# Patient Record
(demographics unavailable — no encounter records)

---

## 2024-11-06 NOTE — ASSESSMENT
[FreeTextEntry1] :  64-year-old man s/p TUNA, urolift in 11/2022 who presents for PAE Evaluation. He is here today for MRI review. About 2 weeks ago, went to ER for AUR after being on a 4-hour flight, straight cath'd in ER and discharged without wood   MRI reviewed, due to large median lobe, recommended HOLEP with Dr. Alvarez. Case discussed with Dr. Mazariegos.  Discuss r/b/a of HOLEP such as bleeding and urinary incontinence  Thank you very much for allowing me to assist in the care of this patient. Please do not hesitate to contact me with any additional questions or concerns.  Sincerely,  Anthony Mi D.O. Professor of Urology and Radiology  of Urology at [facility] Director for Prostate Cancer [address] [place] [address], [address] [place] [address] Phone: [phone].

## 2024-11-06 NOTE — HISTORY OF PRESENT ILLNESS
[FreeTextEntry1] : Dear Dr. Mazariegos,  Thank you so much for the referral to help care for your patient.  Chief Complaint: PAE evaluation Date of first visit: 6/12/24  Sofiya Curry is a 64-year-old man s/p TUNA, urolift in 11/2022 who presents for PAE Evaluation. Reports urinary frequency, intermittency, and urgency, no dysuria, no gross hematuria. Patient unsure if he's had prostate MRI in the past, he had biopsy in 2021. No prostate cancer, HGPIN in 2 cores. No family hx of prostate, kidney and bladder cancers. Taking finasteride and tamsulosin.  Prostate biopsy by Dr. Mazariegos 11/16/21 Negative for prostate cancer HGPIN in [facility]  MRI hx:  MRI in [facility] on 6/26/24 79cc, PIRADS 1, On review, large median lobe, No EPE. No LAD and no bony lesions. The images have been reviewed and clinical implications discussed with the patient.  PSA Hx: 3.09  06/12/2024, 6.18 (corrected for finasteride) psad 0.07 ng/ml/cc  12/4/23 6.77 ng/mL ,13.54 (corrected for finsteride)  PMHx: BPH with LUTS  SxHx: urolift  FHx: none  SocHx: Nonsmoker No alcohol use  Allergies: NKDA  11/06/2024 IPSS   35   QOL 6 Flow nondiagnostic  PVR 110cc   6/12/24 IPSS 28 QOL 4 NATHALIE refused Flow max flow 6.6 ml/s, ave 3.5 ml/s, VV 32.9 cc PVR 22 cc  The patient denies fevers, chills, nausea and or vomiting and no unexplained weight loss.  All pertinent laboratory, films and physician notes were reviewed. Questionnaire results were discussed with patient.  I discussed with the patient and reviewed the risks and benefits of prostate artery embolization.  We discussed the early result and success of the procedure in general as well as my personal results. Specifically, reviewed risk related to nontarget embolization most commonly involving the bladder and/or rectum. We also spoke about the potential for post procedure obstruction required wood catheter drainage. We reviewed some technical aspects up of the procedure including embolic material utilizing the importance of cone beam CT prior to embolization.  Specific risks of the embolization procedure which were discussed with the patient including infection, bleeding, dysuria, hematuria, hematospermia, non-target embolization which would result in damage to bladder wall leading to ulceration/ischemia, rectal wall injury, and injury to penis and ejaculatory mechanism. These risks are considered to be low. Pelvic pain and burning is not uncommon and considered to be mild and transient. The patient understands that a Wood catheter may be inserted during the procedure to help guide embolization and will be removed at the end of the procedure. A small percentage of patients will have some degree of urinary retention after embolization and will require a catheter to be left in. He understands this. We also discussed that long-term results of prostate embolization are unknown but response rates in reduction of his IPSS score are 80-85% based on current literature and state of the art techniques.  It was explained to the patient that approximate 5% patient experienced some degree bruising following femoral or radial artery catheterization. The hematoma is benign and resolves spontaneously under typical circumstances. The specific benefits and risks of transradial (TR) access versus transfemoral (TF) access were discussed in detail with the patient. This includes decreased risk of bleeding, immediate ambulation and faster discharge time. Potential increased and extremely remote risk of cerebral emboli was discussed. The patient was given information packet with further details. Discussed off label use of FDA liquid embolic.  Due to large median lobe seen on MRI, patient agreed to proceed with [facility] Dr. Alvarez.

## 2024-11-06 NOTE — ASSESSMENT
[FreeTextEntry1] :  64-year-old man s/p TUNA, urolift in 11/2022 who presents for PAE Evaluation. He is here today for MRI review. About 2 weeks ago, went to ER for AUR after being on a 4-hour flight, straight cath'd in ER and discharged without wood   MRI reviewed, due to large median lobe, recommended HOLEP with Dr. Alvarez. Case discussed with Dr. Mazariegos.  Discuss r/b/a of HOLEP such as bleeding and urinary incontinence  Thank you very much for allowing me to assist in the care of this patient. Please do not hesitate to contact me with any additional questions or concerns.  Sincerely,  Anthony Mi D.O. Professor of Urology and Radiology  of Urology at [facility] Director for Prostate Cancer [address] [place] [address], [address] [place] [address] Phone: [phone].

## 2024-11-06 NOTE — HISTORY OF PRESENT ILLNESS
[FreeTextEntry1] : Dear Dr. Mazariegos,  Thank you so much for the referral to help care for your patient.  Chief Complaint: PAE evaluation Date of first visit: 6/12/24  Sofiya Curry is a 64-year-old man s/p TUNA, urolift in 11/2022 who presents for PAE Evaluation. Reports urinary frequency, intermittency, and urgency, no dysuria, no gross hematuria. Patient unsure if he's had prostate MRI in the past, he had biopsy in 2021. No prostate cancer, HGPIN in 2 cores. No family hx of prostate, kidney and bladder cancers. Taking finasteride and tamsulosin.  Prostate biopsy by Dr. Mazariegos 11/16/21 Negative for prostate cancer HGPIN in [facility]  MRI hx:  MRI in [facility] on 6/26/24 79cc, PIRADS 1, On review, large median lobe, No EPE. No LAD and no bony lesions. The images have been reviewed and clinical implications discussed with the patient.  PSA Hx: 3.09  06/12/2024, 6.18 (corrected for finasteride) psad 0.07 ng/ml/cc  12/4/23 6.77 ng/mL ,13.54 (corrected for finsteride)  PMHx: BPH with LUTS  SxHx: urolift  FHx: none  SocHx: Nonsmoker No alcohol use  Allergies: NKDA  11/06/2024 IPSS   35   QOL 6 Flow nondiagnostic  PVR 110cc   6/12/24 IPSS 28 QOL 4 NATHALIE refused Flow max flow 6.6 ml/s, ave 3.5 ml/s, VV 32.9 cc PVR 22 cc  The patient denies fevers, chills, nausea and or vomiting and no unexplained weight loss.  All pertinent laboratory, films and physician notes were reviewed. Questionnaire results were discussed with patient.  I discussed with the patient and reviewed the risks and benefits of prostate artery embolization.  We discussed the early result and success of the procedure in general as well as my personal results. Specifically, reviewed risk related to nontarget embolization most commonly involving the bladder and/or rectum. We also spoke about the potential for post procedure obstruction required wood catheter drainage. We reviewed some technical aspects up of the procedure including embolic material utilizing the importance of cone beam CT prior to embolization.  Specific risks of the embolization procedure which were discussed with the patient including infection, bleeding, dysuria, hematuria, hematospermia, non-target embolization which would result in damage to bladder wall leading to ulceration/ischemia, rectal wall injury, and injury to penis and ejaculatory mechanism. These risks are considered to be low. Pelvic pain and burning is not uncommon and considered to be mild and transient. The patient understands that a Wood catheter may be inserted during the procedure to help guide embolization and will be removed at the end of the procedure. A small percentage of patients will have some degree of urinary retention after embolization and will require a catheter to be left in. He understands this. We also discussed that long-term results of prostate embolization are unknown but response rates in reduction of his IPSS score are 80-85% based on current literature and state of the art techniques.  It was explained to the patient that approximate 5% patient experienced some degree bruising following femoral or radial artery catheterization. The hematoma is benign and resolves spontaneously under typical circumstances. The specific benefits and risks of transradial (TR) access versus transfemoral (TF) access were discussed in detail with the patient. This includes decreased risk of bleeding, immediate ambulation and faster discharge time. Potential increased and extremely remote risk of cerebral emboli was discussed. The patient was given information packet with further details. Discussed off label use of FDA liquid embolic.  Due to large median lobe seen on MRI, patient agreed to proceed with [facility] Dr. Alvarez.

## 2024-11-06 NOTE — ASSESSMENT
[FreeTextEntry1] :  64-year-old man s/p TUNA, urolift in 11/2022 who presents for PAE Evaluation. He is here today for MRI review. About 2 weeks ago, went to ER for AUR after being on a 4-hour flight, straight cath'd in ER and discharged without wood   MRI reviewed, due to large median lobe, recommended HOLEP with Dr. Alvarez. Case discussed with Dr. Mazariegos.  Discuss r/b/a of HOLEP such as bleeding and urinary incontinence  Thank you very much for allowing me to assist in the care of this patient. Please do not hesitate to contact me with any additional questions or concerns.  Sincerely,  Anthony Mi D.O. Professor of Urology and Radiology  of Urology at [facility] Director for Prostate Cancer [address] [place] [address], [address] [place] [address] Phone: [phone].

## 2024-11-06 NOTE — ADDENDUM
[FreeTextEntry1] :   I, Dr. Mi, personally performed the evaluation and management (E/M) services for this established patient who presents today with (a) new problem(s)/exacerbation of (an) existing condition(s).  That E/M includes conducting the examination, assessing all new/exacerbated conditions, and establishing a new plan of care.  Today, my ACP, Tomeka Keys, ANP-BC, was here to observe my evaluation and management services for this new problem/exacerbated condition to be followed going forward.

## 2024-11-06 NOTE — HISTORY OF PRESENT ILLNESS
[FreeTextEntry1] : Dear Dr. Mazariegos,  Thank you so much for the referral to help care for your patient.  Chief Complaint: PAE evaluation Date of first visit: 6/12/24  Sofiya Curry is a 64-year-old man s/p TUNA, urolift in 11/2022 who presents for PAE Evaluation. Reports urinary frequency, intermittency, and urgency, no dysuria, no gross hematuria. Patient unsure if he's had prostate MRI in the past, he had biopsy in 2021. No prostate cancer, HGPIN in 2 cores. No family hx of prostate, kidney and bladder cancers. Taking finasteride and tamsulosin.  Prostate biopsy by Dr. Mazariegos 11/16/21 Negative for prostate cancer HGPIN in [facility]  MRI hx:  MRI in [facility] on 6/26/24 79cc, PIRADS 1, On review, large median lobe, No EPE. No LAD and no bony lesions. The images have been reviewed and clinical implications discussed with the patient.  PSA Hx: 3.09  06/12/2024, 6.18 (corrected for finasteride) psad 0.07 ng/ml/cc  12/4/23 6.77 ng/mL ,13.54 (corrected for finsteride)  PMHx: BPH with LUTS  SxHx: urolift  FHx: none  SocHx: Nonsmoker No alcohol use  Allergies: NKDA  11/06/2024 IPSS   35   QOL 6 Flow nondiagnostic  PVR 110cc   6/12/24 IPSS 28 QOL 4 NATHALIE refused Flow max flow 6.6 ml/s, ave 3.5 ml/s, VV 32.9 cc PVR 22 cc  The patient denies fevers, chills, nausea and or vomiting and no unexplained weight loss.  All pertinent laboratory, films and physician notes were reviewed. Questionnaire results were discussed with patient.  I discussed with the patient and reviewed the risks and benefits of prostate artery embolization.  We discussed the early result and success of the procedure in general as well as my personal results. Specifically, reviewed risk related to nontarget embolization most commonly involving the bladder and/or rectum. We also spoke about the potential for post procedure obstruction required wood catheter drainage. We reviewed some technical aspects up of the procedure including embolic material utilizing the importance of cone beam CT prior to embolization.  Specific risks of the embolization procedure which were discussed with the patient including infection, bleeding, dysuria, hematuria, hematospermia, non-target embolization which would result in damage to bladder wall leading to ulceration/ischemia, rectal wall injury, and injury to penis and ejaculatory mechanism. These risks are considered to be low. Pelvic pain and burning is not uncommon and considered to be mild and transient. The patient understands that a Wood catheter may be inserted during the procedure to help guide embolization and will be removed at the end of the procedure. A small percentage of patients will have some degree of urinary retention after embolization and will require a catheter to be left in. He understands this. We also discussed that long-term results of prostate embolization are unknown but response rates in reduction of his IPSS score are 80-85% based on current literature and state of the art techniques.  It was explained to the patient that approximate 5% patient experienced some degree bruising following femoral or radial artery catheterization. The hematoma is benign and resolves spontaneously under typical circumstances. The specific benefits and risks of transradial (TR) access versus transfemoral (TF) access were discussed in detail with the patient. This includes decreased risk of bleeding, immediate ambulation and faster discharge time. Potential increased and extremely remote risk of cerebral emboli was discussed. The patient was given information packet with further details. Discussed off label use of FDA liquid embolic.  Due to large median lobe seen on MRI, patient agreed to proceed with [facility] Dr. Alvarez.

## 2024-11-06 NOTE — PHYSICAL EXAM
no rashes  , no suspicious lesions [General Appearance - In No Acute Distress] : no acute distress [] : no respiratory distress [Abdomen Soft] : soft [Abdomen Tenderness] : non-tender [Normal Station and Gait] : the gait and station were normal for the patient's age [Oriented To Time, Place, And Person] : oriented to person, place, and time

## 2025-01-27 NOTE — ASSESSMENT
[FreeTextEntry1] : 64-year-old man s/p TUNA, urolift in 11/2022 who presents for PAE Evaluation. He is here today for MRI review. About 2 weeks ago, went to ER for AUR after being on a 4-hour flight, straight cath'd in ER and discharged without wood  MRI reviewed, due to large median lobe, recommended HOLEP with Dr. Alvarez. Case discussed with Dr. Mazariegos. Discuss r/b/a of HOLEP such as bleeding and urinary incontinence  Thank you very much for allowing me to assist in the care of this patient. Please do not hesitate to contact me with any additional questions or concerns.  Sincerely,

## 2025-01-27 NOTE — HISTORY OF PRESENT ILLNESS
[FreeTextEntry1] : Dear Dr. Mazariegos,  Thank you so much for the referral to help care for your patient.  Chief Complaint: PAE evaluation Date of first visit: 6/12/24  Sofiya Curry is a 64-year-old man s/p TUNA, urolift in 11/2022 who presents for PAE Evaluation. Reports urinary frequency, intermittency, and urgency, no dysuria, no gross hematuria. Patient unsure if he's had prostate MRI in the past, he had biopsy in 2021. No prostate cancer, HGPIN in 2 cores. No family hx of prostate, kidney and bladder cancers. Taking finasteride and tamsulosin.  Prostate biopsy by Dr. Mazariegos 11/16/21 Negative for prostate cancer HGPIN in [facility]  MRI hx: MRI in [facility] on 6/21/24 79cc, PIRADS 1, On review, large median lobe, No EPE. No LAD and no bony lesions. The images have been reviewed and clinical implications discussed with the patient.  PSA Hx: 3.09 06/12/2024, 6.18 (corrected for finasteride) psad 0.07 ng/ml/cc 12/4/23 6.77 ng/mL ,13.54 (corrected for finsteride)  PMHx: BPH with LUTS  SxHx: urolift  FHx: none  SocHx: Nonsmoker No alcohol use  Allergies: NKDA  11/06/2024 IPSS 35 QOL 6 Flow nondiagnostic PVR 110cc  6/12/24 IPSS 28 QOL 4 NATHALIE refused Flow max flow 6.6 ml/s, ave 3.5 ml/s, VV 32.9 cc PVR 22 cc  The patient denies fevers, chills, nausea and or vomiting and no unexplained weight loss.  All pertinent laboratory, films and physician notes were reviewed. Questionnaire results were discussed with patient.  I discussed with the patient and reviewed the risks and benefits of prostate artery embolization.  We discussed the early result and success of the procedure in general as well as my personal results. Specifically, reviewed risk related to nontarget embolization most commonly involving the bladder and/or rectum. We also spoke about the potential for post procedure obstruction required wood catheter drainage. We reviewed some technical aspects up of the procedure including embolic material utilizing the importance of cone beam CT prior to embolization.  Specific risks of the embolization procedure which were discussed with the patient including infection, bleeding, dysuria, hematuria, hematospermia, non-target embolization which would result in damage to bladder wall leading to ulceration/ischemia, rectal wall injury, and injury to penis and ejaculatory mechanism. These risks are considered to be low. Pelvic pain and burning is not uncommon and considered to be mild and transient. The patient understands that a Wood catheter may be inserted during the procedure to help guide embolization and will be removed at the end of the procedure. A small percentage of patients will have some degree of urinary retention after embolization and will require a catheter to be left in. He understands this. We also discussed that long-term results of prostate embolization are unknown but response rates in reduction of his IPSS score are 80-85% based on current literature and state of the art techniques.  It was explained to the patient that approximate 5% patient experienced some degree bruising following femoral or radial artery catheterization. The hematoma is benign and resolves spontaneously under typical circumstances. The specific benefits and risks of transradial (TR) access versus transfemoral (TF) access were discussed in detail with the patient. This includes decreased risk of bleeding, immediate ambulation and faster discharge time. Potential increased and extremely remote risk of cerebral emboli was discussed. The patient was given information packet with further details. Discussed off label use of FDA liquid embolic.  Due to large median lobe seen on MRI, patient agreed to proceed with [facility] Dr. Alvarez.

## 2025-01-29 NOTE — HISTORY OF PRESENT ILLNESS
[FreeTextEntry1] : Dear Dr. Mazariegos,  Thank you so much for the referral to help care for your patient.  Chief Complaint: PAE evaluation Date of first visit: 6/12/24  Sofiya Curry is a 64-year-old man s/p TUNA, urolift in 11/2022 who presents for PAE Evaluation. Reports urinary frequency, intermittency, and urgency, no dysuria, no gross hematuria. Patient unsure if he's had prostate MRI in the past, he had biopsy in 2021. No prostate cancer, HGPIN in 2 cores. No family hx of prostate, kidney and bladder cancers. Taking finasteride and tamsulosin.  Prostate biopsy by Dr. Mazariegos 11/16/21 Negative for prostate cancer HGPIN in [facility]  MRI hx: MRI in [facility] on 6/21/24 79cc, PIRADS 1, On review, large median lobe, No EPE. No LAD and no bony lesions. The images have been reviewed and clinical implications discussed with the patient.  PSA Hx: 3.09 06/12/2024, 6.18 (corrected for finasteride) psad 0.07 ng/ml/cc 12/4/23 6.77 ng/mL ,13.54 (corrected for finsteride)  PMHx: BPH with LUTS  SxHx: urolift  FHx: none  SocHx: Nonsmoker No alcohol use  Allergies: NKDA  11/06/2024 IPSS 35 QOL 6 Flow nondiagnostic PVR 110cc  6/12/24 IPSS 28 QOL 4 NATHLAIE refused Flow max flow 6.6 ml/s, ave 3.5 ml/s, VV 32.9 cc PVR 22 cc  The patient denies fevers, chills, nausea and or vomiting and no unexplained weight loss.  All pertinent laboratory, films and physician notes were reviewed. Questionnaire results were discussed with patient.  I discussed with the patient and reviewed the risks and benefits of prostate artery embolization.  We discussed the early result and success of the procedure in general as well as my personal results. Specifically, reviewed risk related to nontarget embolization most commonly involving the bladder and/or rectum. We also spoke about the potential for post procedure obstruction required wood catheter drainage. We reviewed some technical aspects up of the procedure including embolic material utilizing the importance of cone beam CT prior to embolization.  Specific risks of the embolization procedure which were discussed with the patient including infection, bleeding, dysuria, hematuria, hematospermia, non-target embolization which would result in damage to bladder wall leading to ulceration/ischemia, rectal wall injury, and injury to penis and ejaculatory mechanism. These risks are considered to be low. Pelvic pain and burning is not uncommon and considered to be mild and transient. The patient understands that a Wood catheter may be inserted during the procedure to help guide embolization and will be removed at the end of the procedure. A small percentage of patients will have some degree of urinary retention after embolization and will require a catheter to be left in. He understands this. We also discussed that long-term results of prostate embolization are unknown but response rates in reduction of his IPSS score are 80-85% based on current literature and state of the art techniques.  It was explained to the patient that approximate 5% patient experienced some degree bruising following femoral or radial artery catheterization. The hematoma is benign and resolves spontaneously under typical circumstances. The specific benefits and risks of transradial (TR) access versus transfemoral (TF) access were discussed in detail with the patient. This includes decreased risk of bleeding, immediate ambulation and faster discharge time. Potential increased and extremely remote risk of cerebral emboli was discussed. The patient was given information packet with further details. Discussed off label use of FDA liquid embolic.  Due to large median lobe seen on MRI, patient agreed to proceed with [facility] Dr. Alvarez.

## 2025-05-01 NOTE — REASON FOR VISIT
Pharmacy requesting clarification for diabetic testing supplies   Transferred to triage que    [Follow-up Visit ___] : a follow-up visit  for [unfilled]

## 2025-05-02 NOTE — ASSESSMENT
[FreeTextEntry1] : Assessment: Mr. JANETH MINAYA  is a 65 year year old man with severe LUTS, prostate volume of 79 cc with large median lobe, taking maximal medical therapy.    We discussed different therapeutic options including TURP, PVP, HoLEP, simple prostatectomy (open, laparoscopic/robotic), Aquablation, Prostate Artery Embolization, Urolift therapy, and Rezum in addition to full continuation of medical therapy. Discussed the issues of incontinence, retrograde ejaculation, erectile dysfunction, irritative voiding symptoms, injury to urethra and bladder, persistence of irritative voiding symptoms, urethral stricture or bladder neck contracture, need for open surgery to repair the injury, erectile dysfunction and infertility.  He strongly desires to preserve ejaculation if possible because he is still considering having children in the future. We discussed median lobe only HoLEP. Discussed that retrograde ejaculation is still possible, with 10-20 % risk. He understands this and would like to proceed.  - OR for median lobe only HoLEP in 8/2025.

## 2025-05-02 NOTE — HISTORY OF PRESENT ILLNESS
[FreeTextEntry1] : Sofiya Curry is a 65-year-old man s/p TUNA, urolift in 11/2022 who presents to discuss HoLEP.   Reports urinary frequency, intermittency, and urgency, no dysuria, no gross hematuria. He has difficulty urinating and has had urinary retention twice recently.  Taking finasteride and tamsulosin.  IPSS 32 PVR 27 cc  Prostate biopsy by Dr. Mazariegos 11/16/21 Negative for prostate cancer [facility] in [facility]  MRI hx: MRI in [facility] on 6/21/24 79cc, PIRADS 1, On review, large median lobe, No EPE. No LAD and no bony lesions. The images have been reviewed and clinical implications discussed with the patient.  PSA Hx: 3.09 06/12/2024, 6.18 (corrected for finasteride) psad 0.07 ng/ml/cc 12/4/23 6.77 ng/mL ,13.54 (corrected for finsteride)  PMHx: BPH with LUTS  SxHx: urolift  FHx: none  SocHx: Nonsmoker No alcohol use  Allergies: NKDA  11/06/2024 IPSS 35 QOL 6 Flow nondiagnostic PVR 110cc  6/12/24 IPSS 28 QOL 4 NATHALIE refused Flow max flow 6.6 ml/s, ave 3.5 ml/s, VV 32.9 cc PVR 22 cc

## 2025-05-02 NOTE — HISTORY OF PRESENT ILLNESS
[FreeTextEntry1] : Sofiya Curry is a 65-year-old man s/p TUNA, urolift in 11/2022 who presents to discuss HoLEP.   Reports urinary frequency, intermittency, and urgency, no dysuria, no gross hematuria. He has difficulty urinating and has had urinary retention twice recently.  Taking finasteride and tamsulosin.  IPSS 32 PVR 27 cc  Prostate biopsy by Dr. Mazariegos 11/16/21 Negative for prostate cancer [facility] in [facility]  MRI hx: MRI in [facility] on 6/21/24 79cc, PIRADS 1, On review, large median lobe, No EPE. No LAD and no bony lesions. The images have been reviewed and clinical implications discussed with the patient.  PSA Hx: 3.09 06/12/2024, 6.18 (corrected for finasteride) psad 0.07 ng/ml/cc 12/4/23 6.77 ng/mL ,13.54 (corrected for finsteride)  PMHx: BPH with LUTS  SxHx: urolift  FHx: none  SocHx: Nonsmoker No alcohol use  Allergies: NKDA  11/06/2024 IPSS 35 QOL 6 Flow nondiagnostic PVR 110cc  6/12/24 IPSS 28 QOL 4 NATHALIE refused Flow max flow 6.6 ml/s, ave 3.5 ml/s, VV 32.9 cc PVR 22 cc